# Patient Record
(demographics unavailable — no encounter records)

---

## 2025-03-24 NOTE — HISTORY OF PRESENT ILLNESS
[de-identified] : CC: snoring   HISTORY OF PRESENT ILLNESS: MEGHAN VÁSQUEZ is a 23 year old male who presents today with concern for JONATHAN he was referred by Dr. Main. he plans to get braces and possibly have jaw/oral surgery he reports feeling constantly sleepy. he sleeps 10-12 hours a night and feels groggy when he wakes up. he sleeps on both his side and back he had a home sleep study revealing AHI of 3.7, negative for JONATHAN he reports taking naps during the day denies witnessed apneic episodes, HTN BMI WNL   REVIEW OF SYSTEMS: General ROS: negative for - chills, fatigue or fever Psychological ROS: negative for - anxiety or depression Ophthalmic ROS: negative for - blurry vision, decreased vision or double vision ENT ROS: negative except as noted from HPI Allergy and Immunology ROS: negative except as noted from HPI Hematological and Lymphatic ROS: negative for - bleeding problems Endocrine ROS: negative for - malaise/lethargy Respiratory ROS: negative for - stridor Cardiovascular ROS: negative for - chest pain Gastrointestinal ROS: negative for - appetite loss or nausea/vomiting Genitourinary ROS: negative for - incontinence Musculoskeletal ROS: negative for - gait disturbance Neurological ROS: negative for - behavioral changes Dermatological ROS: negative for - nail changes   Physical Exam:   GENERAL APPEARANCE: Well-developed and No Acute Distress. COMMUNICATION: Able to Communicate. Strong Voice.   HEAD AND FACE Eyes: Testing of ocular motility including primary gaze alignment normal. Inspection and Appearance: No evidence of lesions or masses Palpation: Palpation of the face reveals no sinus tenderness Salivary Glands: Symmetric without masses Facial Strength: Symmetric without evidence of facial paralysis   EAR, NOSE, MOUTH, and THROAT: Ear Canals and Tympanic Membranes, Bilateral: No evidence of inflammation or lesions. Thresholds: Clinical speech reception thresholds normal. External, Nose and Auricle: No lesions or masses. class 2 occlusion, micro/retrognathia. some midface hypoplasia   NECK: Evaluation: No evidence of masses or crepitus. The neck is symmetric and the trachea is in the midline position. Thyroid: No evidence of enlargement, tenderness or mass. Neck Lymph Nodes: WNL. Respiratory: Inspection of the chest including symmetry, expansion and/or assessment of respiratory effort normal. Cardiovascular: Evaluation of peripheral vascular system by observation and palpation of capillary refill, normal. Neurological/Psychiatric: Alert, Oriented, Mood, and Affect Normal.     IMPRESSION: MEGHAN VÁSQUEZ is a 23 year old male who presents today with snoring, midface hypoplasia, retrognathia   PLAN:  - discussed options of attended sleep study vs. referral to sleep specialist vs. oral appliance -he will consider seeing sleep medicine specialist Dr. Zambrano -he will proceed with orthognathic surgery with Dr. Jose David Mccarty MD [facility] Rhinology and Skull Base Surgery Department of Otolaryngology- Head and Neck Surgery [facility]

## 2025-03-24 NOTE — HISTORY OF PRESENT ILLNESS
[de-identified] : CC: snoring   HISTORY OF PRESENT ILLNESS: MEGHAN VÁSQUEZ is a 23 year old male who presents today with concern for JONATHAN he was referred by Dr. Main. he plans to get braces and possibly have jaw/oral surgery he reports feeling constantly sleepy. he sleeps 10-12 hours a night and feels groggy when he wakes up. he sleeps on both his side and back he had a home sleep study revealing AHI of 3.7, negative for JONATHAN he reports taking naps during the day denies witnessed apneic episodes, HTN BMI WNL   REVIEW OF SYSTEMS: General ROS: negative for - chills, fatigue or fever Psychological ROS: negative for - anxiety or depression Ophthalmic ROS: negative for - blurry vision, decreased vision or double vision ENT ROS: negative except as noted from HPI Allergy and Immunology ROS: negative except as noted from HPI Hematological and Lymphatic ROS: negative for - bleeding problems Endocrine ROS: negative for - malaise/lethargy Respiratory ROS: negative for - stridor Cardiovascular ROS: negative for - chest pain Gastrointestinal ROS: negative for - appetite loss or nausea/vomiting Genitourinary ROS: negative for - incontinence Musculoskeletal ROS: negative for - gait disturbance Neurological ROS: negative for - behavioral changes Dermatological ROS: negative for - nail changes   Physical Exam:   GENERAL APPEARANCE: Well-developed and No Acute Distress. COMMUNICATION: Able to Communicate. Strong Voice.   HEAD AND FACE Eyes: Testing of ocular motility including primary gaze alignment normal. Inspection and Appearance: No evidence of lesions or masses Palpation: Palpation of the face reveals no sinus tenderness Salivary Glands: Symmetric without masses Facial Strength: Symmetric without evidence of facial paralysis   EAR, NOSE, MOUTH, and THROAT: Ear Canals and Tympanic Membranes, Bilateral: No evidence of inflammation or lesions. Thresholds: Clinical speech reception thresholds normal. External, Nose and Auricle: No lesions or masses. class 2 occlusion, micro/retrognathia. some midface hypoplasia   NECK: Evaluation: No evidence of masses or crepitus. The neck is symmetric and the trachea is in the midline position. Thyroid: No evidence of enlargement, tenderness or mass. Neck Lymph Nodes: WNL. Respiratory: Inspection of the chest including symmetry, expansion and/or assessment of respiratory effort normal. Cardiovascular: Evaluation of peripheral vascular system by observation and palpation of capillary refill, normal. Neurological/Psychiatric: Alert, Oriented, Mood, and Affect Normal.     IMPRESSION: MEGHAN VÁSQUEZ is a 23 year old male who presents today with snoring, midface hypoplasia, retrognathia   PLAN:  - discussed options of attended sleep study vs. referral to sleep specialist vs. oral appliance -he will consider seeing sleep medicine specialist Dr. Zambrano -he will proceed with orthognathic surgery with Dr. Jose David Mccarty MD [facility] Rhinology and Skull Base Surgery Department of Otolaryngology- Head and Neck Surgery [facility]